# Patient Record
Sex: FEMALE | Race: WHITE
[De-identification: names, ages, dates, MRNs, and addresses within clinical notes are randomized per-mention and may not be internally consistent; named-entity substitution may affect disease eponyms.]

---
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
## 2021-12-10 ENCOUNTER — HOSPITAL ENCOUNTER (OUTPATIENT)
Age: 21
End: 2021-12-10
Payer: SELF-PAY

## 2021-12-10 DIAGNOSIS — R07.9: Primary | ICD-10-CM

## 2021-12-10 DIAGNOSIS — R00.0: ICD-10-CM

## 2021-12-10 PROCEDURE — 93270 REMOTE 30 DAY ECG REV/REPORT: CPT

## 2023-10-02 ENCOUNTER — OFFICE VISIT (OUTPATIENT)
Dept: FAMILY MEDICINE CLINIC | Facility: CLINIC | Age: 23
End: 2023-10-02
Payer: MEDICAID

## 2023-10-02 VITALS
SYSTOLIC BLOOD PRESSURE: 112 MMHG | WEIGHT: 138.4 LBS | OXYGEN SATURATION: 100 % | BODY MASS INDEX: 23.63 KG/M2 | HEART RATE: 83 BPM | RESPIRATION RATE: 16 BRPM | HEIGHT: 64 IN | TEMPERATURE: 98 F | DIASTOLIC BLOOD PRESSURE: 70 MMHG

## 2023-10-02 DIAGNOSIS — Z30.09 ENCOUNTER FOR GENERAL COUNSELING ON PRESCRIPTION OF ORAL CONTRACEPTIVES: ICD-10-CM

## 2023-10-02 DIAGNOSIS — K31.9 DYSPEPSIA AND DISORDER OF FUNCTION OF STOMACH: Primary | ICD-10-CM

## 2023-10-02 DIAGNOSIS — R10.13 DYSPEPSIA AND DISORDER OF FUNCTION OF STOMACH: Primary | ICD-10-CM

## 2023-10-02 DIAGNOSIS — J45.20 MILD INTERMITTENT ASTHMA WITHOUT COMPLICATION: ICD-10-CM

## 2023-10-02 RX ORDER — PROMETHAZINE HYDROCHLORIDE 25 MG/1
25 TABLET ORAL EVERY 8 HOURS PRN
Qty: 30 TABLET | Refills: 5 | Status: SHIPPED | OUTPATIENT
Start: 2023-10-02

## 2023-10-02 RX ORDER — DICYCLOMINE HYDROCHLORIDE 10 MG/1
10 CAPSULE ORAL
Qty: 120 CAPSULE | Refills: 5 | Status: SHIPPED | OUTPATIENT
Start: 2023-10-02

## 2023-10-02 RX ORDER — DICYCLOMINE HYDROCHLORIDE 10 MG/1
CAPSULE ORAL
COMMUNITY
End: 2023-10-02 | Stop reason: SDUPTHER

## 2023-10-02 RX ORDER — NORGESTIMATE AND ETHINYL ESTRADIOL 7DAYSX3 LO
1 KIT ORAL DAILY
Qty: 28 TABLET | Refills: 11 | Status: SHIPPED | OUTPATIENT
Start: 2023-10-02

## 2023-10-02 RX ORDER — NORGESTIMATE AND ETHINYL ESTRADIOL 7DAYSX3 LO
KIT ORAL
COMMUNITY
End: 2023-10-02 | Stop reason: SDUPTHER

## 2023-10-02 RX ORDER — ALBUTEROL SULFATE 90 UG/1
2 AEROSOL, METERED RESPIRATORY (INHALATION) EVERY 4 HOURS PRN
Qty: 18 G | Refills: 5
Start: 2023-10-02

## 2023-10-02 NOTE — PROGRESS NOTES
"Chief Complaint   Patient presents with    Annual Exam     Medication refill           Subjective      Kristina Hua is a 23 y.o. who presents for long standing GI problems diagnosed as IBS. I am familiar with the patient from my previous practice. She is in need of refills. Meds are used once per week at the most. She denies changes in health. Pap smear was performed in January 2023.         The following portions of the patient's history were reviewed and updated as appropriate: allergies, current medications, past family history, past medical history, past social history, past surgical history, and problem list.    Review of Systems    Objective   Vital Signs:  /70   Pulse 83   Temp 98 °F (36.7 °C)   Resp 16   Ht 162.6 cm (64\")   Wt 62.8 kg (138 lb 6.4 oz)   SpO2 100%   BMI 23.76 kg/m²     BMI is within normal parameters. No other follow-up for BMI required.        Physical Exam  Vitals reviewed.   Constitutional:       Appearance: Normal appearance.   Cardiovascular:      Rate and Rhythm: Normal rate and regular rhythm.      Pulses: Normal pulses.      Heart sounds: Normal heart sounds.   Pulmonary:      Effort: Pulmonary effort is normal.      Breath sounds: Normal breath sounds.   Abdominal:      General: Abdomen is flat.      Palpations: Abdomen is soft.   Neurological:      Mental Status: She is alert.        Result Review                     Assessment and Plan  Diagnoses and all orders for this visit:    1. Dyspepsia and disorder of function of stomach (Primary)  -     promethazine (PHENERGAN) 25 MG tablet; Take 1 tablet by mouth Every 8 (Eight) Hours As Needed for Nausea or Vomiting.  Dispense: 30 tablet; Refill: 5  -     dicyclomine (BENTYL) 10 MG capsule; Take 1 capsule by mouth 4 (Four) Times a Day Before Meals & at Bedtime.  Dispense: 120 capsule; Refill: 5    2. Encounter for general counseling on prescription of oral contraceptives  -     norgestimate-ethinyl estradiol (ORTHO " TRI-CYCLEN LO) 0.18/0.215/0.25 MG-25 MCG per tablet; Take 1 tablet by mouth Daily.  Dispense: 28 tablet; Refill: 11    3. Mild intermittent asthma without complication  -     albuterol sulfate  (90 Base) MCG/ACT inhaler; Inhale 2 puffs Every 4 (Four) Hours As Needed for Wheezing.  Dispense: 18 g; Refill: 5    Meds refilled as conditions are stble. RTO 1 year        Follow Up  Return in about 1 year (around 10/2/2024) for Next scheduled follow up.  Patient was given instructions and counseling regarding her condition or for health maintenance advice. Please see specific information pulled into the AVS if appropriate.

## 2023-10-11 ENCOUNTER — PATIENT ROUNDING (BHMG ONLY) (OUTPATIENT)
Dept: FAMILY MEDICINE CLINIC | Facility: CLINIC | Age: 23
End: 2023-10-11
Payer: MEDICAID

## 2023-10-11 NOTE — PROGRESS NOTES
A My-Chart message has been sent to the patient for [facility] [facility]

## 2024-04-23 ENCOUNTER — OFFICE VISIT (OUTPATIENT)
Dept: FAMILY MEDICINE CLINIC | Facility: CLINIC | Age: 24
End: 2024-04-23
Payer: MEDICAID

## 2024-04-23 VITALS
HEIGHT: 64 IN | TEMPERATURE: 97.1 F | RESPIRATION RATE: 16 BRPM | WEIGHT: 137 LBS | BODY MASS INDEX: 23.39 KG/M2 | DIASTOLIC BLOOD PRESSURE: 80 MMHG | SYSTOLIC BLOOD PRESSURE: 118 MMHG | HEART RATE: 82 BPM

## 2024-04-23 DIAGNOSIS — I49.3 FREQUENT PVCS: ICD-10-CM

## 2024-04-23 DIAGNOSIS — R10.9 RIGHT FLANK PAIN: Primary | ICD-10-CM

## 2024-04-23 DIAGNOSIS — R00.2 PALPITATIONS: ICD-10-CM

## 2024-04-23 DIAGNOSIS — R10.11 RIGHT UPPER QUADRANT PAIN: ICD-10-CM

## 2024-04-23 DIAGNOSIS — Z30.09 ENCOUNTER FOR GENERAL COUNSELING ON PRESCRIPTION OF ORAL CONTRACEPTIVES: ICD-10-CM

## 2024-04-23 DIAGNOSIS — R31.29 OTHER MICROSCOPIC HEMATURIA: ICD-10-CM

## 2024-04-23 LAB
BILIRUB BLD-MCNC: NEGATIVE MG/DL
CLARITY, POC: CLEAR
COLOR UR: YELLOW
GLUCOSE UR STRIP-MCNC: NEGATIVE MG/DL
KETONES UR QL: NEGATIVE
LEUKOCYTE EST, POC: NEGATIVE
NITRITE UR-MCNC: NEGATIVE MG/ML
PH UR: 6 [PH] (ref 5–8)
PROT UR STRIP-MCNC: NEGATIVE MG/DL
RBC # UR STRIP: ABNORMAL /UL
SP GR UR: 1.03 (ref 1–1.03)
UROBILINOGEN UR QL: ABNORMAL

## 2024-04-23 PROCEDURE — 99214 OFFICE O/P EST MOD 30 MIN: CPT | Performed by: FAMILY MEDICINE

## 2024-04-23 RX ORDER — NORGESTIMATE AND ETHINYL ESTRADIOL 7DAYSX3 LO
1 KIT ORAL DAILY
Qty: 28 TABLET | Refills: 11 | Status: SHIPPED | OUTPATIENT
Start: 2024-04-23

## 2024-04-23 NOTE — PROGRESS NOTES
"Chief Complaint   Patient presents with    Flank Pain     [facility] 2 weeks ago and finished antibiotics          Subjective      Kristina Hua is a 24 y.o. who presents for 2 weeks of right flank pain.  Pain began insidiously.  Patient reports pain in the posterior right flank described as a dull ache that he is present mostly when ambulatory and relieved when she lays down.  She denies any injury, trauma, change in activities.  She denies fevers or chills.  Patient was concerned that she had a urinary tract infection and was seen at the [facility] where she was told her urinalysis was normal but given an antibiotic which did not change symptoms.  She has used ibuprofen, Tylenol without change in symptoms.  She denies gross hematuria urinary frequency or urgency..    Patient also requests refill on oral contraceptive.    Patient also requests referral to cardiologist.  I am familiar with the patient from my previous practice in [place].  Over 2 years ago patient presented with palpitations and Holter monitor at the time showed brief runs of a ventricular arrhythmia.  Patient was referred to cardiology but did not follow-up.  She requests referral to a cardiologist within the [facility].    Objective   Vital Signs:  /80   Pulse 82   Temp 97.1 °F (36.2 °C)   Resp 16   Ht 162.6 cm (64\")   Wt 62.1 kg (137 lb)   BMI 23.52 kg/m²     Physical Exam  Vitals reviewed.   Constitutional:       Appearance: Normal appearance.   Cardiovascular:      Rate and Rhythm: Normal rate and regular rhythm.      Pulses: Normal pulses.      Heart sounds: Normal heart sounds.   Pulmonary:      Effort: Pulmonary effort is normal.      Breath sounds: Normal breath sounds.   Abdominal:      General: Abdomen is flat. Bowel sounds are normal.      Palpations: Abdomen is soft.      Tenderness: There is abdominal tenderness in the right upper quadrant. There is no right CVA tenderness.   Neurological:      Mental Status: She is " alert.          Result Review   The following data was reviewed by: Doyle Lawler MD on 04/23/2024:  UA          4/23/2024    08:43   Urinalysis   Ketones, UA Negative    Leukocytes, UA Negative                    Assessment and Plan  Diagnoses and all orders for this visit:    1. Right flank pain (Primary)  -     POC Urinalysis Dipstick  -     CT Abdomen Pelvis Stone Protocol  -     Urine Culture - Urine, Urine, Clean Catch; Future    2. Right upper quadrant pain    3. Other microscopic hematuria  -     CT Abdomen Pelvis Stone Protocol    4. Encounter for general counseling on prescription of oral contraceptives  -     norgestimate-ethinyl estradiol (ORTHO TRI-CYCLEN LO) 0.18/0.215/0.25 MG-25 MCG per tablet; Take 1 tablet by mouth Daily.  Dispense: 28 tablet; Refill: 11    5. Palpitations  -     Ambulatory Referral to Cardiology    6. Frequent PVCs  -     Ambulatory Referral to Cardiology    Plan  1.  Patient has right flank pain with microscopic hematuria.  Will arrange CT abdomen and pelvis with stone protocol.  In the meantime patient will continue ibuprofen  2.  Patient's oral contraceptive was refilled.  3.  Patient will be referred to Dr. Hercules per her request        Follow Up  No follow-ups on file.  Patient was given instructions and counseling regarding her condition or for health maintenance advice. Please see specific information pulled into the AVS if appropriate.

## 2024-04-24 ENCOUNTER — HOSPITAL ENCOUNTER (OUTPATIENT)
Dept: CT IMAGING | Facility: HOSPITAL | Age: 24
Discharge: HOME | End: 2024-04-24
Admitting: FAMILY MEDICINE
Payer: MEDICAID

## 2024-04-24 PROCEDURE — 74176 CT ABD & PELVIS W/O CONTRAST: CPT

## 2024-04-25 LAB
BACTERIA UR CULT: NORMAL
BACTERIA UR CULT: NORMAL

## 2024-05-02 ENCOUNTER — OFFICE VISIT (OUTPATIENT)
Dept: CARDIOLOGY | Facility: CLINIC | Age: 24
End: 2024-05-02
Payer: MEDICAID

## 2024-05-02 VITALS
OXYGEN SATURATION: 99 % | HEART RATE: 80 BPM | HEIGHT: 64 IN | DIASTOLIC BLOOD PRESSURE: 80 MMHG | WEIGHT: 137 LBS | BODY MASS INDEX: 23.39 KG/M2 | SYSTOLIC BLOOD PRESSURE: 118 MMHG

## 2024-05-02 DIAGNOSIS — R07.89 CHEST PAIN, ATYPICAL: Primary | ICD-10-CM

## 2024-05-02 DIAGNOSIS — R00.2 PALPITATIONS: ICD-10-CM

## 2024-05-02 PROCEDURE — 1159F MED LIST DOCD IN RCRD: CPT | Performed by: NURSE PRACTITIONER

## 2024-05-02 PROCEDURE — 93000 ELECTROCARDIOGRAM COMPLETE: CPT | Performed by: NURSE PRACTITIONER

## 2024-05-02 PROCEDURE — 99204 OFFICE O/P NEW MOD 45 MIN: CPT | Performed by: NURSE PRACTITIONER

## 2024-05-02 PROCEDURE — 1160F RVW MEDS BY RX/DR IN RCRD: CPT | Performed by: NURSE PRACTITIONER

## 2024-05-02 NOTE — PROGRESS NOTES
[facility] Provider Note     Date:   2024   Name: Kristina Hua  :   2000  PCP: Doyle Lawler MD    Chief Complaint   Patient presents with    Establish Care     Cardiac evaluation       Subjective     History of Present Illness  Kristina Hua is a 24 y.o. female who presents today for new patient evaluation for chest pain.  Patient reports that she has been experiencing chronic chest pain for several years now.  The chest pain occurs randomly and is associated with a fast heart rate.  Episodes occur several times per week and sometimes the pain radiates to her left arm.  She also becomes short of breath during the episodes.  The pain will sometimes wake her up at night.  She has occasional palpitations.  No previous heart disease.  However, she was evaluated by cardiologist several years ago and was told that she had an arrhythmia.  She was unable to tolerate beta-blockers.  Her father has a history of CAD with several heart attacks and stents.    No specialty comments available.     Reports Denies   Chest Pain [x] []   Shortness of Air [x] []   Palpitations [x] []   Edema [] []   Dizziness [x] []   Syncope [] [x]       No Known Allergies    Current Outpatient Medications:     albuterol sulfate  (90 Base) MCG/ACT inhaler, Inhale 2 puffs Every 4 (Four) Hours As Needed for Wheezing., Disp: 18 g, Rfl: 5    dicyclomine (BENTYL) 10 MG capsule, Take 1 capsule by mouth 4 (Four) Times a Day Before Meals & at Bedtime., Disp: 120 capsule, Rfl: 5    norgestimate-ethinyl estradiol (ORTHO TRI-CYCLEN LO) 0.18/0.215/0.25 MG-25 MCG per tablet, Take 1 tablet by mouth Daily., Disp: 28 tablet, Rfl: 11    promethazine (PHENERGAN) 25 MG tablet, Take 1 tablet by mouth Every 8 (Eight) Hours As Needed for Nausea or Vomiting., Disp: 30 tablet, Rfl: 5    Past Medical History:   Diagnosis Date    Asthma     Athletic asthma    History of medical problems     Stomach  "and head migraines    Visual impairment     Wear glasses      History reviewed. No pertinent surgical history.  Family History   Problem Relation Age of Onset    Hyperlipidemia Mother     Coronary artery disease Father     Asthma Sister         Athletic asthma     Social History     Socioeconomic History    Marital status: Single   Tobacco Use    Smoking status: Every Day     Types: Electronic Cigarette     Passive exposure: Current    Smokeless tobacco: Never   Vaping Use    Vaping status: Every Day    Substances: Nicotine, Flavoring    Devices: Pre-filled pod   Substance and Sexual Activity    Alcohol use: Yes     Alcohol/week: 7.0 standard drinks of alcohol     Types: 7 Cans of beer per week    Drug use: Never    Sexual activity: Yes     Partners: Male     Birth control/protection: Pill, Same-sex partner       Objective     Vital Signs:  /80 (BP Location: Left arm, Patient Position: Sitting)   Pulse 80   Ht 162.6 cm (64\")   Wt 62.1 kg (137 lb)   SpO2 99%   BMI 23.52 kg/m²   Estimated body mass index is 23.52 kg/m² as calculated from the following:    Height as of this encounter: 162.6 cm (64\").    Weight as of this encounter: 62.1 kg (137 lb).       BMI is within normal parameters. No other follow-up for BMI required.      Physical Exam  Constitutional:       Appearance: Normal appearance. She is well-developed.   HENT:      Head: Normocephalic and atraumatic.   Eyes:      Pupils: Pupils are equal, round, and reactive to light.   Neck:      Vascular: No carotid bruit.   Cardiovascular:      Rate and Rhythm: Normal rate and regular rhythm.      Heart sounds: Normal heart sounds. No murmur heard.  Pulmonary:      Breath sounds: Normal breath sounds. No wheezing or rhonchi.   Musculoskeletal:      Right lower leg: No edema.      Left lower leg: No edema.   Skin:     Capillary Refill: Capillary refill takes less than 2 seconds.      Coloration: Skin is not cyanotic.      Nails: There is no clubbing. "   Neurological:      Mental Status: She is alert and oriented to person, place, and time.      Motor: No weakness.      Gait: Gait normal.   Psychiatric:         Mood and Affect: Mood normal.         Behavior: Behavior is cooperative.         Thought Content: Thought content normal.         Cognition and Memory: Memory normal.                 ECG 12 Lead    Date/Time: 5/2/2024 3:26 PM  Performed by: Tamra Valencia APRN    Authorized by: Tamra Valencia APRN  Comparison: not compared with previous ECG   Previous ECG: no previous ECG available  Rhythm: sinus rhythm and sinus arrhythmia  Rate: normal  BPM: 76  QRS axis: normal    Clinical impression: normal ECG           Assessment and Plan     Diagnoses and all orders for this visit:    1. Chest pain, atypical (Primary)  Assessment & Plan:  Patient reports episodes of chest pain, tachycardia and shortness of breath that occur randomly, usually 2-3 times a week.  - Holter monitor and echocardiogram for further evaluation  - We discussed treadmill stress test.  Patient would like to complete monitor and echo first and then discuss need for stress test.    Orders:  -     ECG 12 Lead  -     Adult Transthoracic Echo Complete W/ Cont if Necessary Per Protocol; Future    2. Palpitations  Assessment & Plan:  14-day Holter monitor for further evaluation    Orders:  -     Holter Monitor - 72 Hour Up To 15 Days  -     Adult Transthoracic Echo Complete W/ Cont if Necessary Per Protocol; Future        Recommendations: Report if any new/changing symptoms immediately          Follow Up  Return in about 6 weeks (around 6/13/2024) for cardiac testing results.  Patient was given instructions and counseling regarding her condition or for health maintenance advice. Please see specific information pulled into the AVS if appropriate.

## 2024-05-03 ENCOUNTER — PATIENT ROUNDING (BHMG ONLY) (OUTPATIENT)
Dept: CARDIOLOGY | Facility: CLINIC | Age: 24
End: 2024-05-03
Payer: MEDICAID

## 2024-05-03 NOTE — PROGRESS NOTES
..My name is Laila Gaston and I am the Referral Coordinator for [facility].    I would like to thank you for being a loyal patient. If you do not mind I would like to ask you a few questions about your recent visit with us.  Please feel free to reply if you wish to provide us with feedback on your first visit with our practice.    First, could you tell me what went well with your recent visit?    Secondly, we are always looking for ways to make our patients' experiences even better.  Do you have any recommendations on ways we may improve?    Finally, overall were you satisfied with your first visit to us as a [facility] facility?    In the next few days, you will be receiving a Patient Experience Survey.      Thank you for taking the time to answer a few questions today.  I hope you have a good day.

## 2024-05-03 NOTE — PROGRESS NOTES
Spoke with patient.  She was a new patient to my schedule yesterday.  Mary was nice to see her for me.  Patient would like to follow-up with me in [place].  She will need a follow-up on Holter results and echo so will need to be the first or second Friday I am available later.  Thanks.  I told her we would be given her call.

## 2024-05-05 PROBLEM — R07.89 CHEST PAIN, ATYPICAL: Status: ACTIVE | Noted: 2024-05-05

## 2024-05-05 PROBLEM — R00.2 PALPITATIONS: Status: ACTIVE | Noted: 2024-05-05

## 2024-05-05 NOTE — ASSESSMENT & PLAN NOTE
Patient reports episodes of chest pain, tachycardia and shortness of breath that occur randomly, usually 2-3 times a week.  - Holter monitor and echocardiogram for further evaluation  - We discussed treadmill stress test.  Patient would like to complete monitor and echo first and then discuss need for stress test.

## 2024-05-30 ENCOUNTER — OFFICE VISIT (OUTPATIENT)
Dept: CARDIOLOGY | Facility: CLINIC | Age: 24
End: 2024-05-30
Payer: MEDICAID

## 2024-05-30 VITALS
BODY MASS INDEX: 23.22 KG/M2 | DIASTOLIC BLOOD PRESSURE: 74 MMHG | OXYGEN SATURATION: 99 % | HEART RATE: 89 BPM | WEIGHT: 136 LBS | SYSTOLIC BLOOD PRESSURE: 110 MMHG | HEIGHT: 64 IN

## 2024-05-30 DIAGNOSIS — R07.89 CHEST PAIN, ATYPICAL: Primary | ICD-10-CM

## 2024-05-30 DIAGNOSIS — R00.2 PALPITATIONS: ICD-10-CM

## 2024-05-30 PROCEDURE — 99214 OFFICE O/P EST MOD 30 MIN: CPT | Performed by: INTERNAL MEDICINE

## 2024-05-30 NOTE — PROGRESS NOTES
[facility] Provider Note     Date:   2024   Name: Kristina Hua  :   2000  PCP: Doyle Lawler MD    Chief Complaint   Patient presents with   • Chest pain, atypical     Echo/Holter results       Subjective     History of Present Illness  Kristina Hua is a 24 y.o. female who presents today for new patient appointment for evaluation of cardiac complaints.  Chest pain for three hours last night, and HR was jumping around a lot, has an rj on her phone to check it.   She previously wore a monitor with another cardiologist, which showed 9 beat nonsustained VT. She says that epsidoe was similar to her sytmpsoms but during this more recent monitor she did not have symptoms. She felt off balance, did not pass out, but was aware of being sort of lightheaded. Those also caused chest pain too. Usually when it happens it can go from a few seconds up to 3 hours.     Played sports as a kid, did well. Asthma was her only limiting factor. These symptoms did not occur until 19 years olf.   Discussed loop recorder with prior cardiologist, has thought about it more and whether or not to proceed.   Recently she has also noticed that her arms and legs will go numb easily.     No specialty comments available.    No Known Allergies    Current Outpatient Medications:   •  albuterol sulfate  (90 Base) MCG/ACT inhaler, Inhale 2 puffs Every 4 (Four) Hours As Needed for Wheezing., Disp: 18 g, Rfl: 5  •  dicyclomine (BENTYL) 10 MG capsule, Take 1 capsule by mouth 4 (Four) Times a Day Before Meals & at Bedtime., Disp: 120 capsule, Rfl: 5  •  norgestimate-ethinyl estradiol (ORTHO TRI-CYCLEN LO) 0.18/0.215/0.25 MG-25 MCG per tablet, Take 1 tablet by mouth Daily., Disp: 28 tablet, Rfl: 11  •  promethazine (PHENERGAN) 25 MG tablet, Take 1 tablet by mouth Every 8 (Eight) Hours As Needed for Nausea or Vomiting., Disp: 30 tablet, Rfl: 5    Past Medical History:   Diagnosis  "Date   • Asthma     Athletic asthma   • History of medical problems     Stomach and head migraines   • Visual impairment     Wear glasses      History reviewed. No pertinent surgical history.  Family History   Problem Relation Age of Onset   • Hyperlipidemia Mother    • Coronary artery disease Father    • Asthma Sister         Athletic asthma     Social History     Socioeconomic History   • Marital status: Single   Tobacco Use   • Smoking status: Every Day     Types: Electronic Cigarette     Passive exposure: Current   • Smokeless tobacco: Never   Vaping Use   • Vaping status: Every Day   • Substances: Nicotine, Flavoring   • Devices: Disposable, Pre-filled pod   • Passive vaping exposure: Yes   Substance and Sexual Activity   • Alcohol use: Yes     Alcohol/week: 7.0 standard drinks of alcohol     Types: 7 Cans of beer per week   • Drug use: Never   • Sexual activity: Yes     Partners: Male     Birth control/protection: Pill, Same-sex partner       Objective     Vital Signs:  /74   Pulse 89   Ht 162.6 cm (64\")   Wt 61.7 kg (136 lb)   SpO2 99%   BMI 23.34 kg/m²   Estimated body mass index is 23.34 kg/m² as calculated from the following:    Height as of this encounter: 162.6 cm (64\").    Weight as of this encounter: 61.7 kg (136 lb).         Physical Exam  Vitals reviewed.   Constitutional:       General: She is not in acute distress.     Appearance: Normal appearance.   HENT:      Head: Normocephalic and atraumatic.      Mouth/Throat:      Mouth: Mucous membranes are moist.   Eyes:      Conjunctiva/sclera: Conjunctivae normal.   Neck:      Vascular: No carotid bruit.   Cardiovascular:      Rate and Rhythm: Normal rate and regular rhythm.      Pulses: Normal pulses.      Heart sounds: Normal heart sounds. No murmur heard.  Pulmonary:      Effort: Pulmonary effort is normal. No respiratory distress.      Breath sounds: Normal breath sounds. No wheezing or rhonchi.   Abdominal:      General: Abdomen is flat. "      Palpations: Abdomen is soft.   Musculoskeletal:      Cervical back: Normal range of motion and neck supple.      Right lower leg: No edema.      Left lower leg: No edema.   Skin:     General: Skin is warm and dry.      Coloration: Skin is not jaundiced.   Neurological:      General: No focal deficit present.      Mental Status: She is alert and oriented to person, place, and time. Mental status is at baseline.      GCS: GCS eye subscore is 4. GCS verbal subscore is 5. GCS motor subscore is 6.      Cranial Nerves: No cranial nerve deficit.      Motor: No weakness.      Gait: Gait normal.   Psychiatric:         Mood and Affect: Mood and affect normal. Mood is not anxious.         Speech: Speech normal.         Behavior: Behavior normal.                     Assessment and Plan     Diagnoses and all orders for this visit:    1. Chest pain, atypical (Primary)  Comments:  Atypical.  Plan cardiac MRI for ruling out congenital abnormalities.  Prior workup reviewed.  Orders:  -     MRI Cardiac Function Limited With & Without Morphology; Future    2. Palpitations  Comments:  Sounds like an SVT.  Sudden onset and offset.  Could also be POTS syndrome.  Discussed loop recorder.  Will proceed after MRI.  Orders:  -     MRI Cardiac Function Limited With & Without Morphology; Future        Recommendations: Report if any new/changing symptoms immediately      Follow Up  No follow-ups on file.    Ewa Hercules MD   05/30/2024     Please note that this explicitly excludes time spent on other separate billable services such as performing procedures or test interpretation, when applicable.    This note was created using dictation software which occasionally transcribes nonsensical phrases. Please contact the provider if any clarification is needed.

## 2024-10-25 ENCOUNTER — OFFICE VISIT (OUTPATIENT)
Dept: FAMILY MEDICINE CLINIC | Facility: CLINIC | Age: 24
End: 2024-10-25
Payer: MEDICAID

## 2024-10-25 VITALS
OXYGEN SATURATION: 97 % | RESPIRATION RATE: 16 BRPM | BODY MASS INDEX: 24.75 KG/M2 | HEIGHT: 64 IN | HEART RATE: 88 BPM | SYSTOLIC BLOOD PRESSURE: 118 MMHG | WEIGHT: 145 LBS | DIASTOLIC BLOOD PRESSURE: 70 MMHG | TEMPERATURE: 97.8 F

## 2024-10-25 DIAGNOSIS — Z30.09 ENCOUNTER FOR GENERAL COUNSELING ON PRESCRIPTION OF ORAL CONTRACEPTIVES: ICD-10-CM

## 2024-10-25 DIAGNOSIS — N30.01 ACUTE CYSTITIS WITH HEMATURIA: Primary | ICD-10-CM

## 2024-10-25 DIAGNOSIS — R30.0 DYSURIA: ICD-10-CM

## 2024-10-25 LAB
BILIRUB BLD-MCNC: NEGATIVE MG/DL
CLARITY, POC: CLEAR
COLOR UR: YELLOW
EXPIRATION DATE: ABNORMAL
GLUCOSE UR STRIP-MCNC: NEGATIVE MG/DL
KETONES UR QL: NEGATIVE
LEUKOCYTE EST, POC: ABNORMAL
Lab: ABNORMAL
NITRITE UR-MCNC: NEGATIVE MG/ML
PH UR: 7 [PH] (ref 5–8)
PROT UR STRIP-MCNC: NEGATIVE MG/DL
RBC # UR STRIP: ABNORMAL /UL
SP GR UR: 1 (ref 1–1.03)
UROBILINOGEN UR QL: NORMAL

## 2024-10-25 PROCEDURE — 99213 OFFICE O/P EST LOW 20 MIN: CPT | Performed by: FAMILY MEDICINE

## 2024-10-25 RX ORDER — NORGESTIMATE AND ETHINYL ESTRADIOL 7DAYSX3 LO
1 KIT ORAL DAILY
Qty: 28 TABLET | Refills: 11 | Status: SHIPPED | OUTPATIENT
Start: 2024-10-25

## 2024-10-25 RX ORDER — CIPROFLOXACIN 500 MG/1
500 TABLET, FILM COATED ORAL 2 TIMES DAILY
Qty: 6 TABLET | Refills: 0 | Status: SHIPPED | OUTPATIENT
Start: 2024-10-25

## 2024-10-25 RX ORDER — PHENAZOPYRIDINE HYDROCHLORIDE 200 MG/1
200 TABLET, FILM COATED ORAL 3 TIMES DAILY PRN
Qty: 10 TABLET | Refills: 0 | Status: SHIPPED | OUTPATIENT
Start: 2024-10-25

## 2024-10-25 NOTE — PROGRESS NOTES
Subjective   Kristina Hua is a 24 y.o. female.     Dysuria   Associated symptoms include frequency.        The last couple days  Dysuria  and freqeuncy  No fever, no N/V      The following portions of the patient's history were reviewed and updated as appropriate: allergies, current medications, past family history, past medical history, past social history, past surgical history, and problem list.    Review of Systems   Genitourinary:  Positive for dysuria and frequency.       Objective   Physical Exam  Vitals and nursing note reviewed.   Constitutional:       General: She is not in acute distress.     Appearance: Normal appearance. She is well-developed.   Cardiovascular:      Rate and Rhythm: Normal rate and regular rhythm.      Heart sounds: Normal heart sounds.   Pulmonary:      Effort: Pulmonary effort is normal.      Breath sounds: Normal breath sounds.   Abdominal:      Tenderness: There is no right CVA tenderness or left CVA tenderness.   Neurological:      Mental Status: She is alert and oriented to person, place, and time.   Psychiatric:         Mood and Affect: Mood normal.         Behavior: Behavior normal.         Thought Content: Thought content normal.         Judgment: Judgment normal.         Assessment & Plan   Diagnoses and all orders for this visit:    1. Acute cystitis with hematuria (Primary)  -     ciprofloxacin (Cipro) 500 MG tablet; Take 1 tablet by mouth 2 (Two) Times a Day.  Dispense: 6 tablet; Refill: 0  -     phenazopyridine (Pyridium) 200 MG tablet; Take 1 tablet by mouth 3 (Three) Times a Day As Needed for Bladder Spasms.  Dispense: 10 tablet; Refill: 0  -     Urine Culture - Urine, Urine, Clean Catch    2. Dysuria  -     POCT urinalysis dipstick, automated    3. Encounter for general counseling on prescription of oral contraceptives  -     norgestimate-ethinyl estradiol (ORTHO TRI-CYCLEN LO) 0.18/0.215/0.25 MG-25 MCG per tablet; Take 1 tablet by mouth Daily.  Dispense: 28  tablet; Refill: 11    UA + LE and blood.  Treat with cipro and pyridium.  Call back INB

## 2024-10-29 LAB
BACTERIA UR CULT: ABNORMAL

## 2024-12-18 ENCOUNTER — OFFICE VISIT (OUTPATIENT)
Dept: FAMILY MEDICINE CLINIC | Facility: CLINIC | Age: 24
End: 2024-12-18
Payer: MEDICAID

## 2024-12-18 VITALS
HEART RATE: 86 BPM | TEMPERATURE: 93.4 F | WEIGHT: 143.5 LBS | OXYGEN SATURATION: 99 % | BODY MASS INDEX: 24.5 KG/M2 | HEIGHT: 64 IN | SYSTOLIC BLOOD PRESSURE: 118 MMHG | DIASTOLIC BLOOD PRESSURE: 76 MMHG

## 2024-12-18 DIAGNOSIS — J06.9 ACUTE URI: ICD-10-CM

## 2024-12-18 DIAGNOSIS — J02.9 SORE THROAT: Primary | ICD-10-CM

## 2024-12-18 LAB
EXPIRATION DATE: NORMAL
EXPIRATION DATE: NORMAL
FLUAV AG UPPER RESP QL IA.RAPID: NOT DETECTED
FLUBV AG UPPER RESP QL IA.RAPID: NOT DETECTED
INTERNAL CONTROL: NORMAL
INTERNAL CONTROL: NORMAL
Lab: NORMAL
Lab: NORMAL
S PYO AG THROAT QL: NEGATIVE
SARS-COV-2 AG UPPER RESP QL IA.RAPID: NOT DETECTED

## 2024-12-18 PROCEDURE — 87880 STREP A ASSAY W/OPTIC: CPT | Performed by: FAMILY MEDICINE

## 2024-12-18 PROCEDURE — 99213 OFFICE O/P EST LOW 20 MIN: CPT | Performed by: FAMILY MEDICINE

## 2024-12-18 PROCEDURE — 87428 SARSCOV & INF VIR A&B AG IA: CPT | Performed by: FAMILY MEDICINE

## 2024-12-18 RX ORDER — BROMPHENIRAMINE MALEATE, PSEUDOEPHEDRINE HYDROCHLORIDE, AND DEXTROMETHORPHAN HYDROBROMIDE 2; 30; 10 MG/5ML; MG/5ML; MG/5ML
5 SYRUP ORAL 4 TIMES DAILY PRN
Qty: 180 ML | Refills: 0 | Status: SHIPPED | OUTPATIENT
Start: 2024-12-18

## 2024-12-18 RX ORDER — PREDNISONE 20 MG/1
40 TABLET ORAL DAILY
Qty: 8 TABLET | Refills: 0 | Status: SHIPPED | OUTPATIENT
Start: 2024-12-18

## 2024-12-18 NOTE — PROGRESS NOTES
Subjective   Kristina Hua is a 24 y.o. female.     History of Present Illness     She has been ill the past 48 hours  Started with ST  Then had body aches and congestion  She has had productive cough as well  No fever, no chills, no sweats  Some mild achiness    The following portions of the patient's history were reviewed and updated as appropriate: allergies, current medications, past family history, past medical history, past social history, past surgical history, and problem list.    Review of Systems   Constitutional:  Negative for fever.   HENT:  Positive for congestion.    Respiratory:  Positive for cough.    Musculoskeletal:  Positive for myalgias.   Psychiatric/Behavioral: Negative.         Objective   Physical Exam  Vitals and nursing note reviewed.   Constitutional:       Appearance: She is well-developed.   HENT:      Head: Normocephalic and atraumatic.      Right Ear: Hearing, tympanic membrane, ear canal and external ear normal.      Left Ear: Hearing, tympanic membrane, ear canal and external ear normal.      Nose: Nose normal.      Mouth/Throat:      Pharynx: Uvula midline.   Eyes:      Conjunctiva/sclera: Conjunctivae normal.   Cardiovascular:      Rate and Rhythm: Normal rate and regular rhythm.      Heart sounds: Normal heart sounds.   Pulmonary:      Effort: Pulmonary effort is normal.      Breath sounds: Normal breath sounds.   Musculoskeletal:      Cervical back: Normal range of motion.   Lymphadenopathy:      Cervical: No cervical adenopathy.   Psychiatric:         Behavior: Behavior normal.         Assessment & Plan   Diagnoses and all orders for this visit:    1. Sore throat (Primary)  -     POCT rapid strep A    2. Acute URI  -     POCT SARS-CoV-2 Antigen VICKY + Flu    Other orders  -     brompheniramine-pseudoephedrine-DM 30-2-10 MG/5ML syrup; Take 5 mL by mouth 4 (Four) Times a Day As Needed for Cough or Congestion.  Dispense: 180 mL; Refill: 0  -     predniSONE (DELTASONE) 20 MG  tablet; Take 2 tablets by mouth Daily.  Dispense: 8 tablet; Refill: 0    Negative Covid, Flu and strep  Ok bromfed and steroid burst.  Fluids, rest, and time.  Asked pt to call back INB in future

## 2025-02-07 NOTE — TELEPHONE ENCOUNTER
"    Caller: Artemio Hualee Valero \"Valero\"    Relationship: Self    Best call back number: [phone]    Requested Prescriptions:   Requested Prescriptions     Pending Prescriptions Disp Refills    brompheniramine-pseudoephedrine-DM 30-2-10 MG/5ML syrup 180 mL 0     Sig: Take 5 mL by mouth 4 (Four) Times a Day As Needed for Cough or Congestion.        Pharm[facility] sent[id number] [address] AT [facility] [address] - [phone] [facility] [phone]      Last office visit with prescribing clinician: 4/23/2024   Last telemedicine visit with prescribing clinician: Visit date not found   Next office visit with prescribing clinician: Visit date not found     Additional details provided by patient: PATIENT STATES SHE STILL HAS BAD COUGH WOULD LIKE A REFILL     Does the patient have less than a 3 day supply:  [x] Yes  [] No    Would you like a call back once the refill request has been completed: [] Yes [x] No    If the office needs to give you a call back, can they leave a voicemail: [] Yes [x] No    Junior Lomeli Rep   02/07/25 13:59 EST            "

## 2025-02-11 RX ORDER — BROMPHENIRAMINE MALEATE, PSEUDOEPHEDRINE HYDROCHLORIDE, AND DEXTROMETHORPHAN HYDROBROMIDE 2; 30; 10 MG/5ML; MG/5ML; MG/5ML
5 SYRUP ORAL 4 TIMES DAILY PRN
Qty: 180 ML | Refills: 0 | OUTPATIENT
Start: 2025-02-11

## 2025-03-17 DIAGNOSIS — K31.9 DYSPEPSIA AND DISORDER OF FUNCTION OF STOMACH: ICD-10-CM

## 2025-03-17 DIAGNOSIS — R10.13 DYSPEPSIA AND DISORDER OF FUNCTION OF STOMACH: ICD-10-CM

## 2025-03-17 RX ORDER — PROMETHAZINE HYDROCHLORIDE 25 MG/1
25 TABLET ORAL EVERY 8 HOURS PRN
Qty: 30 TABLET | Refills: 0 | Status: SHIPPED | OUTPATIENT
Start: 2025-03-17

## 2025-03-17 NOTE — TELEPHONE ENCOUNTER
"    Caller: Melita Kristina Valero \"Valero\"    Relationship: Self    Best call back number: [phone]     Requested Prescriptions:   Requested Prescriptions     Pending Prescriptions Disp Refills    promethazine (PHENERGAN) 25 MG tablet 30 tablet 5     Sig: Take 1 tablet by mouth Every 8 (Eight) Hours As Needed for Nausea or Vomiting.        Pharmacy where request should be sent: [facility] - [address] - [phone]  - [phone]     Last office visit with prescribing clinician: 4/23/2024   Last telemedicine visit with prescribing clinician: Visit date not found   Next office visit with prescribing clinician: Visit date not found     Additional details provided by patient:     Does the patient have less than a 3 day supply:  [] Yes  [x] No    Would you like a call back once the refill request has been completed: [] Yes [] No    If the office needs to give you a call back, can they leave a voicemail: [] Yes [] No    Junior Flowers Rep   03/17/25 11:03 EDT       "

## 2025-07-08 ENCOUNTER — OFFICE VISIT (OUTPATIENT)
Dept: FAMILY MEDICINE CLINIC | Facility: CLINIC | Age: 25
End: 2025-07-08
Payer: MEDICAID

## 2025-07-08 VITALS
TEMPERATURE: 98.9 F | OXYGEN SATURATION: 100 % | DIASTOLIC BLOOD PRESSURE: 70 MMHG | SYSTOLIC BLOOD PRESSURE: 130 MMHG | HEART RATE: 92 BPM | WEIGHT: 141 LBS | HEIGHT: 64 IN | BODY MASS INDEX: 24.07 KG/M2 | RESPIRATION RATE: 16 BRPM

## 2025-07-08 DIAGNOSIS — Z72.51 HIGH RISK HETEROSEXUAL BEHAVIOR: ICD-10-CM

## 2025-07-08 DIAGNOSIS — B36.0 TINEA VERSICOLOR: ICD-10-CM

## 2025-07-08 DIAGNOSIS — N89.8 VAGINAL DISCHARGE: Primary | ICD-10-CM

## 2025-07-08 PROCEDURE — 99213 OFFICE O/P EST LOW 20 MIN: CPT | Performed by: FAMILY MEDICINE

## 2025-07-08 RX ORDER — ITRACONAZOLE 100 MG/1
200 CAPSULE ORAL DAILY
Qty: 10 CAPSULE | Refills: 0 | Status: SHIPPED | OUTPATIENT
Start: 2025-07-08 | End: 2025-07-13

## 2025-07-08 RX ORDER — ITRACONAZOLE 100 MG/1
200 CAPSULE ORAL DAILY
Qty: 10 CAPSULE | Refills: 0 | Status: SHIPPED | OUTPATIENT
Start: 2025-07-08 | End: 2025-07-08

## 2025-07-08 NOTE — PROGRESS NOTES
"Chief Complaint   Patient presents with    Vaginitis     Started 2 wks ago, odor, yellow discharge.       Subjective      Kristina Marylu Hua is a 25 y.o. who presents for multiple issues.    Vaginal discharge.  Strong smelling yellow discharge for the last 2 weeks.  Patient is sexually active.  Significant other does not use condoms.  She is on birth control pills.  Denies dysuria or vaginal itching.  Discharge is different than past yeast infections.  She would like simultaneous testing for chlamydia and gonorrhea.    Second concern is a pruritic rash that is changing the color of her skin.  She describes a previous PCP treating her for this condition in the past.        The following portions of the patient's history were reviewed and updated as appropriate: allergies, current medications, past family history, past medical history, past social history, past surgical history, and problem list.    Review of Systems    Objective   Vital Signs:  /70   Pulse 92   Temp 98.9 °F (37.2 °C)   Resp 16   Ht 162.6 cm (64\")   Wt 64 kg (141 lb)   SpO2 100%   BMI 24.20 kg/m²     BMI is within normal parameters. No other follow-up for BMI required.        Physical Exam  Vitals reviewed.   Constitutional:       Appearance: Normal appearance.   Skin:     Comments: Tinea versicolor of the chest, shoulders, back   Neurological:      Mental Status: She is alert.          Result Review                     Assessment and Plan  Diagnoses and all orders for this visit:    1. Vaginal discharge (Primary)  -     NuSwab VG+ - Swab, Vagina    2. High risk heterosexual behavior  -     Chlamydia trachomatis, Neisseria gonorrhoeae, PCR - , Urine, Clean Catch    3. Tinea versicolor  -     Discontinue: itraconazole (Sporanox) 100 MG capsule; Take 2 capsules by mouth Daily for 5 days.  Dispense: 10 capsule; Refill: 0  -     itraconazole (Sporanox) 100 MG capsule; Take 2 capsules by mouth Daily for 5 days.  Dispense: 10 capsule; " Refill: 0    Plan  1.  Laboratory testing for causes of vaginitis as well as other STIs were collected in office.  Patient will be contacted with results  2.  For patient's tinea versicolor she will be given 5 days of itraconazole        Follow Up  No follow-ups on file.  Patient was given instructions and counseling regarding her condition or for health maintenance advice. Please see specific information pulled into the AVS if appropriate.

## 2025-07-10 LAB
A VAGINAE DNA VAG QL NAA+PROBE: ABNORMAL SCORE
BVAB2 DNA VAG QL NAA+PROBE: ABNORMAL SCORE
C ALBICANS DNA VAG QL NAA+PROBE: POSITIVE
C GLABRATA DNA VAG QL NAA+PROBE: NEGATIVE
C TRACH DNA SPEC QL NAA+PROBE: NEGATIVE
C TRACH RRNA SPEC QL NAA+PROBE: NORMAL
MEGA1 DNA VAG QL NAA+PROBE: ABNORMAL SCORE
N GONORRHOEA DNA VAG QL NAA+PROBE: NEGATIVE
N GONORRHOEA RRNA SPEC QL NAA+PROBE: NORMAL
T VAGINALIS DNA VAG QL NAA+PROBE: NEGATIVE

## 2025-08-05 DIAGNOSIS — Z30.09 ENCOUNTER FOR GENERAL COUNSELING ON PRESCRIPTION OF ORAL CONTRACEPTIVES: ICD-10-CM

## 2025-08-05 RX ORDER — NORGESTIMATE AND ETHINYL ESTRADIOL 7DAYSX3 LO
1 KIT ORAL DAILY
Qty: 84 TABLET | Refills: 3 | Status: SHIPPED | OUTPATIENT
Start: 2025-08-05

## 2025-08-28 ENCOUNTER — OFFICE VISIT (OUTPATIENT)
Dept: FAMILY MEDICINE CLINIC | Facility: CLINIC | Age: 25
End: 2025-08-28
Payer: MEDICAID

## 2025-08-28 VITALS
WEIGHT: 140 LBS | HEIGHT: 64 IN | RESPIRATION RATE: 20 BRPM | BODY MASS INDEX: 23.9 KG/M2 | HEART RATE: 96 BPM | DIASTOLIC BLOOD PRESSURE: 75 MMHG | OXYGEN SATURATION: 99 % | SYSTOLIC BLOOD PRESSURE: 125 MMHG | TEMPERATURE: 97.8 F

## 2025-08-28 DIAGNOSIS — K31.9 DYSPEPSIA AND DISORDER OF FUNCTION OF STOMACH: ICD-10-CM

## 2025-08-28 DIAGNOSIS — R10.13 DYSPEPSIA AND DISORDER OF FUNCTION OF STOMACH: ICD-10-CM

## 2025-08-28 DIAGNOSIS — L81.6 HYPOPIGMENTATION OF SKIN: Primary | ICD-10-CM

## 2025-08-28 PROCEDURE — 99213 OFFICE O/P EST LOW 20 MIN: CPT | Performed by: FAMILY MEDICINE

## 2025-08-28 RX ORDER — PROMETHAZINE HYDROCHLORIDE 25 MG/1
25 TABLET ORAL EVERY 8 HOURS PRN
Qty: 30 TABLET | Refills: 5 | Status: SHIPPED | OUTPATIENT
Start: 2025-08-28

## 2025-08-28 RX ORDER — DICYCLOMINE HYDROCHLORIDE 10 MG/1
10 CAPSULE ORAL
Qty: 120 CAPSULE | Refills: 5 | Status: SHIPPED | OUTPATIENT
Start: 2025-08-28